# Patient Record
Sex: MALE | Race: BLACK OR AFRICAN AMERICAN | NOT HISPANIC OR LATINO | Employment: FULL TIME | URBAN - METROPOLITAN AREA
[De-identification: names, ages, dates, MRNs, and addresses within clinical notes are randomized per-mention and may not be internally consistent; named-entity substitution may affect disease eponyms.]

---

## 2017-10-10 DIAGNOSIS — I25.10 CORONARY ARTERY DISEASE, ANGINA PRESENCE UNSPECIFIED, UNSPECIFIED VESSEL OR LESION TYPE, UNSPECIFIED WHETHER NATIVE OR TRANSPLANTED HEART: Primary | ICD-10-CM

## 2017-12-19 ENCOUNTER — TELEPHONE (OUTPATIENT)
Dept: CARDIOLOGY | Facility: CLINIC | Age: 62
End: 2017-12-19

## 2017-12-19 NOTE — TELEPHONE ENCOUNTER
----- Message from Obed Moscoso sent at 12/19/2017 12:09 PM CST -----  Contact: Pt   PT is requesting an earlier appointment due to he had a procedure done and has  severe tingling in his feet Pt insist on message being sent stated      Pt can be reached at [phone]

## 2018-02-09 ENCOUNTER — TELEPHONE (OUTPATIENT)
Dept: RESEARCH | Facility: HOSPITAL | Age: 63
End: 2018-02-09

## 2018-02-16 ENCOUNTER — CLINICAL SUPPORT (OUTPATIENT)
Dept: CARDIOLOGY | Facility: CLINIC | Age: 63
End: 2018-02-16
Payer: MEDICAID

## 2018-02-16 ENCOUNTER — OFFICE VISIT (OUTPATIENT)
Dept: CARDIOLOGY | Facility: CLINIC | Age: 63
End: 2018-02-16
Payer: MEDICAID

## 2018-02-16 ENCOUNTER — TELEPHONE (OUTPATIENT)
Dept: CARDIOLOGY | Facility: CLINIC | Age: 63
End: 2018-02-16

## 2018-02-16 VITALS
SYSTOLIC BLOOD PRESSURE: 126 MMHG | HEART RATE: 85 BPM | DIASTOLIC BLOOD PRESSURE: 72 MMHG | HEIGHT: 66 IN | WEIGHT: 160 LBS | BODY MASS INDEX: 25.71 KG/M2

## 2018-02-16 DIAGNOSIS — I70.212 ATHEROSCLEROSIS OF NATIVE ARTERY OF LEFT LOWER EXTREMITY WITH INTERMITTENT CLAUDICATION: ICD-10-CM

## 2018-02-16 DIAGNOSIS — I25.10 CORONARY ARTERY DISEASE DUE TO CALCIFIED CORONARY LESION: ICD-10-CM

## 2018-02-16 DIAGNOSIS — I10 ESSENTIAL HYPERTENSION: ICD-10-CM

## 2018-02-16 DIAGNOSIS — I25.2 OLD MI (MYOCARDIAL INFARCTION): ICD-10-CM

## 2018-02-16 DIAGNOSIS — I25.10 CORONARY ARTERY DISEASE, ANGINA PRESENCE UNSPECIFIED, UNSPECIFIED VESSEL OR LESION TYPE, UNSPECIFIED WHETHER NATIVE OR TRANSPLANTED HEART: ICD-10-CM

## 2018-02-16 DIAGNOSIS — E78.5 HYPERLIPIDEMIA, UNSPECIFIED HYPERLIPIDEMIA TYPE: ICD-10-CM

## 2018-02-16 DIAGNOSIS — F17.200 TOBACCO DEPENDENCE SYNDROME: ICD-10-CM

## 2018-02-16 DIAGNOSIS — I25.84 CORONARY ARTERY DISEASE DUE TO CALCIFIED CORONARY LESION: ICD-10-CM

## 2018-02-16 DIAGNOSIS — I70.219 ATHEROSCLEROTIC PVD WITH INTERMITTENT CLAUDICATION: Primary | ICD-10-CM

## 2018-02-16 DIAGNOSIS — E11.21 TYPE 2 DIABETES MELLITUS WITH DIABETIC NEPHROPATHY, WITHOUT LONG-TERM CURRENT USE OF INSULIN: ICD-10-CM

## 2018-02-16 PROCEDURE — 99214 OFFICE O/P EST MOD 30 MIN: CPT | Mod: S$PBB,,, | Performed by: INTERNAL MEDICINE

## 2018-02-16 PROCEDURE — 3008F BODY MASS INDEX DOCD: CPT | Mod: ,,, | Performed by: INTERNAL MEDICINE

## 2018-02-16 PROCEDURE — 99999 PR PBB SHADOW E&M-EST. PATIENT-LVL III: CPT | Mod: PBBFAC,,, | Performed by: INTERNAL MEDICINE

## 2018-02-16 PROCEDURE — 93005 ELECTROCARDIOGRAM TRACING: CPT | Mod: PBBFAC,PO | Performed by: INTERNAL MEDICINE

## 2018-02-16 PROCEDURE — 99213 OFFICE O/P EST LOW 20 MIN: CPT | Mod: PBBFAC,PO | Performed by: INTERNAL MEDICINE

## 2018-02-16 PROCEDURE — 93010 ELECTROCARDIOGRAM REPORT: CPT | Mod: S$PBB,,, | Performed by: INTERNAL MEDICINE

## 2018-02-16 RX ORDER — ATORVASTATIN CALCIUM 10 MG/1
10 TABLET, FILM COATED ORAL DAILY
COMMUNITY
Start: 2016-04-16

## 2018-02-16 RX ORDER — PREGABALIN 100 MG/1
100 CAPSULE ORAL 3 TIMES DAILY
COMMUNITY
Start: 2017-07-14

## 2018-02-16 NOTE — PROGRESS NOTES
Subjective:   Patient ID:  Vitor Mayes III is a 62 y.o. male who presents for follow up of Coronary Artery Disease; Hypertension; and Hyperlipidemia      HPI  A 63 yo male with cad old mi pvd s/p femoral stent is here for f/u. He has an accident on the job then fell of a ladder. He has numbness in his leg had vascular procedure by [facility]. AND WAS TOLD HE HAS NERVE DAMAGE. HIS FEET FEELS FUNNY WEAK. NUMB FEELS LIKE PAPER. HE HAS NEUROPATHY SYMPTOMS WITH A GLOVE LIKE SYMPTOMS. HE CAN WALK 75 FEET HE HAS TO SIT  DOWN AND HIS FEET FEELS LIKE GIVING UP ON HIM WORSE ON THE RIGHT. HE IS NOT SMOKING. HE ALSO HAS BILATERAL CALF PAIN WORSE ON THE RT. HE IS VERY LIMITED.  Past Medical History:   Diagnosis Date    Atherosclerotic PVD with intermittent claudication     Coronary artery disease     Diabetes mellitus, type 2     Hyperlipidemia     Hypertension     Myocardial infarction     Old MI (myocardial infarction)        Past Surgical History:   Procedure Laterality Date    ANGIOPLASTY      CORONARY ANGIOPLASTY      CORONARY STENT PLACEMENT         Social History   Substance Use Topics    Smoking status: Current Every Day Smoker     Packs/day: 0.25     Years: 40.00     Types: Cigarettes    Smokeless tobacco: Never Used    Alcohol use No       Family History   Problem Relation Age of Onset    Diabetes type II Mother     Hypertension Mother     Hyperlipidemia Mother        Current Outpatient Prescriptions   Medication Sig    acetaminophen-codeine 300-30mg (TYLENOL #3) 300-30 mg Tab Take 1 tablet by mouth every 6 (six) hours as needed.    albiglutide 30 mg/0.5 mL PnIj [id number] (RxNorm)    ANORO ELLIPTA 62.5-25 mcg/actuation DsDv     aspirin (ECOTRIN) 81 MG EC tablet Take 1 tablet (81 mg total) by mouth once daily.    atorvastatin (LIPITOR) 10 MG tablet Take 10 mg by mouth once daily.    CELECOXIB (CELEBREX ORAL) Take by mouth.    clopidogrel (PLAVIX) 75 mg tablet TAKE ONE TABLET BY MOUTH ONCE DAILY     gabapentin (NEURONTIN) 300 MG capsule Take 300 mg by mouth once daily.    metformin (GLUCOPHAGE) 1000 MG tablet Take 1 tablet (1,000 mg total) by mouth 2 (two) times daily with meals.    metoprolol tartrate (LOPRESSOR) 25 MG tablet Take 1 tablet (25 mg total) by mouth 2 (two) times daily.    pregabalin (LYRICA) 100 MG capsule Take 100 mg by mouth 3 (three) times daily.    PROAIR HFA 90 mcg/actuation inhaler     umeclidinium-vilanterol 62.5-25 mcg/actuation DsDv [id number] (RxNorm)     No current facility-administered medications for this visit.      Current Outpatient Prescriptions on File Prior to Visit   Medication Sig    acetaminophen-codeine 300-30mg (TYLENOL #3) 300-30 mg Tab Take 1 tablet by mouth every 6 (six) hours as needed.    ANORO ELLIPTA 62.5-25 mcg/actuation DsDv     aspirin (ECOTRIN) 81 MG EC tablet Take 1 tablet (81 mg total) by mouth once daily.    CELECOXIB (CELEBREX ORAL) Take by mouth.    clopidogrel (PLAVIX) 75 mg tablet TAKE ONE TABLET BY MOUTH ONCE DAILY    gabapentin (NEURONTIN) 300 MG capsule Take 300 mg by mouth once daily.    metformin (GLUCOPHAGE) 1000 MG tablet Take 1 tablet (1,000 mg total) by mouth 2 (two) times daily with meals.    metoprolol tartrate (LOPRESSOR) 25 MG tablet Take 1 tablet (25 mg total) by mouth 2 (two) times daily.    PROAIR HFA 90 mcg/actuation inhaler      No current facility-administered medications on file prior to visit.      Review of patient's allergies indicates:  No Known Allergies  Review of Systems   Constitution: Negative for weakness and malaise/fatigue.   Eyes: Negative for blurred vision.   Cardiovascular: Positive for claudication. Negative for chest pain, cyanosis, dyspnea on exertion, irregular heartbeat, leg swelling, near-syncope, orthopnea, palpitations and paroxysmal nocturnal dyspnea.   Respiratory: Negative for cough, hemoptysis and shortness of breath.    Hematologic/Lymphatic: Negative for bleeding problem. Does not bruise/bleed  easily.   Skin: Negative for dry skin and itching.   Musculoskeletal: Positive for falls. Negative for muscle weakness and myalgias.   Gastrointestinal: Negative for abdominal pain, diarrhea, heartburn, hematemesis, hematochezia and melena.   Genitourinary: Negative for flank pain and hematuria.   Neurological: Positive for disturbances in coordination, loss of balance, numbness and paresthesias. Negative for dizziness, focal weakness, headaches, light-headedness and seizures.   Psychiatric/Behavioral: Negative for altered mental status and memory loss. The patient is not nervous/anxious.    Allergic/Immunologic: Negative for hives.       Objective:   Physical Exam   Constitutional: He is oriented to person, place, and time. He appears well-developed and well-nourished. No distress.   HENT:   Head: Normocephalic and atraumatic.   Eyes: EOM are normal. Pupils are equal, round, and reactive to light. Right eye exhibits no discharge. Left eye exhibits no discharge.   Neck: Neck supple. No JVD present. No thyromegaly present.   Cardiovascular: Normal rate, regular rhythm and normal heart sounds.  Exam reveals decreased pulses. Exam reveals no gallop and no friction rub.    No murmur heard.  Pulses:       Carotid pulses are 2+ on the right side, and 2+ on the left side.       Radial pulses are 2+ on the right side, and 2+ on the left side.        Femoral pulses are 1+ on the right side with bruit, and 2+ on the left side with bruit.       Popliteal pulses are 0 on the right side, and 0 on the left side.        Dorsalis pedis pulses are 0 on the right side, and 0 on the left side.        Posterior tibial pulses are 0 on the right side, and 0 on the left side.   HAS MONOPHASIC SIGNAL IN BOTH FEET.   Pulmonary/Chest: Effort normal and breath sounds normal. No respiratory distress. He has no wheezes. He has no rales. He exhibits no tenderness.   Abdominal: Soft. Bowel sounds are normal. He exhibits no distension. There is no  "tenderness.   Musculoskeletal: Normal range of motion. He exhibits no edema.   Neurological: He is alert and oriented to person, place, and time. No cranial nerve deficit.   Skin: Skin is warm and dry. No rash noted. He is not diaphoretic. No erythema.   COLD FEET   DECREASED SENSATION IN FEET.   Psychiatric: He has a normal mood and affect. His behavior is normal.   Nursing note and vitals reviewed.    Vitals:    02/16/18 1016   BP: 126/72   Pulse: 85   Weight: 72.6 kg (160 lb)   Height: 5' 6" (1.676 m)     Lab Results   Component Value Date    CHOL 176 12/30/2015    CHOL 193 08/13/2014     Lab Results   Component Value Date    HDL 54 12/30/2015    HDL 52 08/13/2014     Lab Results   Component Value Date    LDLCALC 90.0 12/30/2015    LDLCALC 87.8 08/13/2014     Lab Results   Component Value Date    TRIG 160 (H) 12/30/2015    TRIG 266 (H) 08/13/2014     Lab Results   Component Value Date    CHOLHDL 30.7 12/30/2015    CHOLHDL 26.9 08/13/2014       Chemistry        Component Value Date/Time     12/30/2015 1035    K 3.9 12/30/2015 1035     12/30/2015 1035    CO2 29 12/30/2015 1035    BUN 10 12/30/2015 1035    CREATININE 0.9 12/30/2015 1035     (H) 12/30/2015 1035        Component Value Date/Time    CALCIUM 9.6 12/30/2015 1035    ALKPHOS 117 12/30/2015 1035    AST 17 12/30/2015 1035    ALT 14 12/30/2015 1035    BILITOT 0.5 12/30/2015 1035    ESTGFRAFRICA >60.0 12/30/2015 1035    EGFRNONAA >60.0 12/30/2015 1035          Lab Results   Component Value Date    TSH 0.622 08/13/2014     Lab Results   Component Value Date    INR 1.0 10/08/2014    INR 1.0 08/28/2014    INR 1.0 08/13/2014     Lab Results   Component Value Date    WBC 7.25 10/08/2014    HGB 13.6 (L) 10/08/2014    HCT 39.3 (L) 10/08/2014     (H) 10/08/2014     10/08/2014     BMP  Sodium   Date Value Ref Range Status   12/30/2015 139 136 - 145 mmol/L Final     Potassium   Date Value Ref Range Status   12/30/2015 3.9 3.5 - 5.1 mmol/L " Final     Chloride   Date Value Ref Range Status   12/30/2015 102 95 - 110 mmol/L Final     CO2   Date Value Ref Range Status   12/30/2015 29 23 - 29 mmol/L Final     BUN, Bld   Date Value Ref Range Status   12/30/2015 10 6 - 20 mg/dL Final     Creatinine   Date Value Ref Range Status   12/30/2015 0.9 0.5 - 1.4 mg/dL Final     Calcium   Date Value Ref Range Status   12/30/2015 9.6 8.7 - 10.5 mg/dL Final     Anion Gap   Date Value Ref Range Status   12/30/2015 8 8 - 16 mmol/L Final     eGFR if    Date Value Ref Range Status   12/30/2015 >60.0 >60 mL/min/1.73 m^2 Final     eGFR if non    Date Value Ref Range Status   12/30/2015 >60.0 >60 mL/min/1.73 m^2 Final     Comment:     Calculation used to obtain the estimated glomerular filtration  rate (eGFR) is the CKD-EPI equation. Since race is unknown   in our information system, the eGFR values for   -American and Non--American patients are given   for each creatinine result.       CrCl cannot be calculated (Patient's most recent lab result is older than the maximum 7 days allowed.).    Assessment:     1. Atherosclerotic PVD with intermittent claudication    2. Old MI (myocardial infarction)    3. Coronary artery disease due to calcified coronary lesion    4. Essential hypertension    5. Hyperlipidemia, unspecified hyperlipidemia type    6. Type 2 diabetes mellitus with diabetic nephropathy, without long-term current use of insulin    7. Tobacco dependence syndrome    8. Atherosclerosis of native artery of left lower extremity with intermittent claudication      HAS MIXED PICTURE OF SEVERE PVD AND NEUROPATHY . HE NEEDS AGGRESSIVE LIPID MANAGEMENT AND NEUROPATHY THERAPY IN ADDITION TO EVALUATION OF LOWER EXTREMITY VASCULATURE.  Plan:   AGUSTO   BILATERAL LOWER EXTREMITY ARTERIAL.  GET RECORDS [facility].  LIPIDS CMP A1C.  PHONE REVIEW AND DECIDE F/U.

## 2018-02-16 NOTE — TELEPHONE ENCOUNTER
Let message for medication records-radiology to obtain films on patient's lhc and ao with run-off.

## 2018-02-16 NOTE — TELEPHONE ENCOUNTER
----- Message from Jodi Hurtado sent at 2/16/2018  2:46 PM CST -----  Contact: [facility]   She's calling in regards to missed call pls call back at  [phone]

## 2018-02-21 ENCOUNTER — LAB VISIT (OUTPATIENT)
Dept: LAB | Facility: HOSPITAL | Age: 63
End: 2018-02-21
Attending: INTERNAL MEDICINE
Payer: MEDICAID

## 2018-02-21 ENCOUNTER — CLINICAL SUPPORT (OUTPATIENT)
Dept: CARDIOLOGY | Facility: CLINIC | Age: 63
End: 2018-02-21
Attending: INTERNAL MEDICINE
Payer: MEDICAID

## 2018-02-21 DIAGNOSIS — E11.21 TYPE 2 DIABETES MELLITUS WITH DIABETIC NEPHROPATHY, WITHOUT LONG-TERM CURRENT USE OF INSULIN: ICD-10-CM

## 2018-02-21 DIAGNOSIS — I70.212 ATHEROSCLEROSIS OF NATIVE ARTERY OF LEFT LOWER EXTREMITY WITH INTERMITTENT CLAUDICATION: ICD-10-CM

## 2018-02-21 DIAGNOSIS — E78.5 HYPERLIPIDEMIA, UNSPECIFIED HYPERLIPIDEMIA TYPE: ICD-10-CM

## 2018-02-21 LAB
ALBUMIN SERPL BCP-MCNC: 3.7 G/DL
ALP SERPL-CCNC: 115 U/L
ALT SERPL W/O P-5'-P-CCNC: 18 U/L
ANION GAP SERPL CALC-SCNC: 9 MMOL/L
AST SERPL-CCNC: 20 U/L
BILIRUB SERPL-MCNC: 0.8 MG/DL
BUN SERPL-MCNC: 10 MG/DL
CALCIUM SERPL-MCNC: 10 MG/DL
CHLORIDE SERPL-SCNC: 100 MMOL/L
CHOLEST SERPL-MCNC: 148 MG/DL
CHOLEST/HDLC SERPL: 3 {RATIO}
CO2 SERPL-SCNC: 27 MMOL/L
CREAT SERPL-MCNC: 1.1 MG/DL
EST. GFR  (AFRICAN AMERICAN): >60 ML/MIN/1.73 M^2
EST. GFR  (NON AFRICAN AMERICAN): >60 ML/MIN/1.73 M^2
ESTIMATED AVG GLUCOSE: 286 MG/DL
GLUCOSE SERPL-MCNC: 220 MG/DL
HBA1C MFR BLD HPLC: 11.6 %
HDLC SERPL-MCNC: 50 MG/DL
HDLC SERPL: 33.8 %
LDLC SERPL CALC-MCNC: 78.2 MG/DL
NONHDLC SERPL-MCNC: 98 MG/DL
POTASSIUM SERPL-SCNC: 4.5 MMOL/L
PROT SERPL-MCNC: 7.7 G/DL
SODIUM SERPL-SCNC: 136 MMOL/L
TRIGL SERPL-MCNC: 99 MG/DL
VASCULAR ANKLE BRACHIAL INDEX (ABI) RIGHT: 0.66 (ref 0.9–1.2)

## 2018-02-21 PROCEDURE — 93922 UPR/L XTREMITY ART 2 LEVELS: CPT | Mod: PBBFAC,PO | Performed by: INTERNAL MEDICINE

## 2018-02-21 PROCEDURE — 80061 LIPID PANEL: CPT

## 2018-02-21 PROCEDURE — 83036 HEMOGLOBIN GLYCOSYLATED A1C: CPT

## 2018-02-21 PROCEDURE — 36415 COLL VENOUS BLD VENIPUNCTURE: CPT | Mod: PO

## 2018-02-21 PROCEDURE — 93925 LOWER EXTREMITY STUDY: CPT | Mod: PBBFAC,PO | Performed by: INTERNAL MEDICINE

## 2018-02-21 PROCEDURE — 80053 COMPREHEN METABOLIC PANEL: CPT

## 2018-02-26 ENCOUNTER — TELEPHONE (OUTPATIENT)
Dept: CARDIOLOGY | Facility: CLINIC | Age: 63
End: 2018-02-26

## 2018-02-26 NOTE — TELEPHONE ENCOUNTER
The patient has been notified of this information and all questions answered.  Pt states he has appt with his pcp next week and will address A1C then

## 2018-02-26 NOTE — TELEPHONE ENCOUNTER
----- Message from Lupe Dillon MD sent at 2/25/2018  8:59 AM CST -----  His sugar is significantly uncontrolled needs to have it treated wither pcp or endocrinologist . His cholesterol is in range

## 2018-02-26 NOTE — TELEPHONE ENCOUNTER
----- Message from Lupe Dillon MD sent at 2/25/2018  8:58 AM CST -----  Has good blood flow to lwgs. He still has blockage but no intervention is needed. Most of his symptoms are from neuropathy

## 2018-02-27 ENCOUNTER — TELEPHONE (OUTPATIENT)
Dept: CARDIOLOGY | Facility: CLINIC | Age: 63
End: 2018-02-27

## 2018-02-27 NOTE — TELEPHONE ENCOUNTER
----- Message from Ela Mcelroy sent at 2/27/2018  9:42 AM CST -----  Contact: pt  Patient is calling for Test results, pt is at doctor office and they are requesting, please fax @ [phone]. Thanks!